# Patient Record
(demographics unavailable — no encounter records)

---

## 2024-10-15 NOTE — DISCUSSION/SUMMARY
[de-identified] : 73-year-old woman with mild right elbow lateral epicondylitis  -X-ray and physical exam findings were discussed with the patient.  The pain is not constant and her pain occurs mostly with carrying her grandchild.  She does not want any formal physical therapy at this time.  -Different stretches were demonstrated in the office today. -Weightbearing as tolerated -Physical therapy -Pain medication: Mobic -Follow up in as needed with x-rays at that time. -All the patient's questions and concerns were addressed during this visit

## 2024-10-15 NOTE — DISCUSSION/SUMMARY
[de-identified] : 73-year-old woman with mild right elbow lateral epicondylitis  -X-ray and physical exam findings were discussed with the patient.  The pain is not constant and her pain occurs mostly with carrying her grandchild.  She does not want any formal physical therapy at this time.  -Different stretches were demonstrated in the office today. -Weightbearing as tolerated -Physical therapy -Pain medication: Mobic -Follow up in as needed with x-rays at that time. -All the patient's questions and concerns were addressed during this visit

## 2024-10-15 NOTE — DISCUSSION/SUMMARY
[de-identified] : 73-year-old woman with mild right elbow lateral epicondylitis  -X-ray and physical exam findings were discussed with the patient.  The pain is not constant and her pain occurs mostly with carrying her grandchild.  She does not want any formal physical therapy at this time.  -Different stretches were demonstrated in the office today. -Weightbearing as tolerated -Physical therapy -Pain medication: Mobic -Follow up in as needed with x-rays at that time. -All the patient's questions and concerns were addressed during this visit

## 2024-10-15 NOTE — PHYSICAL EXAM
[de-identified] : The patient is sitting comfortably in the exam room.  RIGHT arm. -Skin is intact, no swelling, no ecchymosis -Mild tenderness to palpation laterally around the elbow, no tenderness medially -No pain with palpation over the radial head with range of motion -Range of motion elbow 0-130 -Pronation 90, supination 90 -Stable to varus and valgus stress -Able to make a fist -Sensation is intact median, radial, ulnar, axillary nerves -Motor is intact median, radial, ulnar, axillary nerves -Hand is warm and well-perfused, Palpable radial and ulnar pulses  [de-identified] : X-rays of the right elbow are taken in the office today including AP, lateral, oblique, radial head view.  X-rays show good overall alignment of the elbow.  No fractures or dislocations.

## 2024-10-15 NOTE — HISTORY OF PRESENT ILLNESS
[de-identified] : Ms. HAWK CLAYTON is a 73 year old RHD woman presents today for initial evaluation of right arm pain and swelling for the last few weeks. Denies injury/trauma. She notes a burning sensation at the right elbow with intermittent tenderness, worse when lifting heavy objects. Denies numbness/tingling.   Of note, she had a left distal radius fracture on 9/30/22 that was treated nonoperatively.

## 2024-10-15 NOTE — PHYSICAL EXAM
[de-identified] : The patient is sitting comfortably in the exam room.  RIGHT arm. -Skin is intact, no swelling, no ecchymosis -Mild tenderness to palpation laterally around the elbow, no tenderness medially -No pain with palpation over the radial head with range of motion -Range of motion elbow 0-130 -Pronation 90, supination 90 -Stable to varus and valgus stress -Able to make a fist -Sensation is intact median, radial, ulnar, axillary nerves -Motor is intact median, radial, ulnar, axillary nerves -Hand is warm and well-perfused, Palpable radial and ulnar pulses  [de-identified] : X-rays of the right elbow are taken in the office today including AP, lateral, oblique, radial head view.  X-rays show good overall alignment of the elbow.  No fractures or dislocations.

## 2024-10-15 NOTE — HISTORY OF PRESENT ILLNESS
[de-identified] : Ms. HAWK CLAYTON is a 73 year old RHD woman presents today for initial evaluation of right arm pain and swelling for the last few weeks. Denies injury/trauma. She notes a burning sensation at the right elbow with intermittent tenderness, worse when lifting heavy objects. Denies numbness/tingling.   Of note, she had a left distal radius fracture on 9/30/22 that was treated nonoperatively.

## 2024-10-15 NOTE — PHYSICAL EXAM
[de-identified] : The patient is sitting comfortably in the exam room.  RIGHT arm. -Skin is intact, no swelling, no ecchymosis -Mild tenderness to palpation laterally around the elbow, no tenderness medially -No pain with palpation over the radial head with range of motion -Range of motion elbow 0-130 -Pronation 90, supination 90 -Stable to varus and valgus stress -Able to make a fist -Sensation is intact median, radial, ulnar, axillary nerves -Motor is intact median, radial, ulnar, axillary nerves -Hand is warm and well-perfused, Palpable radial and ulnar pulses  [de-identified] : X-rays of the right elbow are taken in the office today including AP, lateral, oblique, radial head view.  X-rays show good overall alignment of the elbow.  No fractures or dislocations.

## 2024-10-15 NOTE — HISTORY OF PRESENT ILLNESS
[de-identified] : Ms. HAWK CLAYTON is a 73 year old RHD woman presents today for initial evaluation of right arm pain and swelling for the last few weeks. Denies injury/trauma. She notes a burning sensation at the right elbow with intermittent tenderness, worse when lifting heavy objects. Denies numbness/tingling.   Of note, she had a left distal radius fracture on 9/30/22 that was treated nonoperatively.